# Patient Record
Sex: FEMALE | Race: WHITE | NOT HISPANIC OR LATINO | Employment: FULL TIME | URBAN - METROPOLITAN AREA
[De-identification: names, ages, dates, MRNs, and addresses within clinical notes are randomized per-mention and may not be internally consistent; named-entity substitution may affect disease eponyms.]

---

## 2023-07-28 ENCOUNTER — LAB (OUTPATIENT)
Dept: LAB | Facility: LAB | Age: 58
End: 2023-07-28
Payer: COMMERCIAL

## 2023-07-28 ENCOUNTER — OFFICE VISIT (OUTPATIENT)
Dept: PRIMARY CARE | Facility: CLINIC | Age: 58
End: 2023-07-28
Payer: COMMERCIAL

## 2023-07-28 VITALS
HEART RATE: 88 BPM | BODY MASS INDEX: 20.83 KG/M2 | DIASTOLIC BLOOD PRESSURE: 78 MMHG | SYSTOLIC BLOOD PRESSURE: 118 MMHG | HEIGHT: 64 IN | OXYGEN SATURATION: 98 % | WEIGHT: 122 LBS

## 2023-07-28 DIAGNOSIS — E55.9 VITAMIN D DEFICIENCY: ICD-10-CM

## 2023-07-28 DIAGNOSIS — E55.9 VITAMIN D DEFICIENCY: Primary | ICD-10-CM

## 2023-07-28 DIAGNOSIS — E78.2 MODERATE MIXED HYPERLIPIDEMIA NOT REQUIRING STATIN THERAPY: ICD-10-CM

## 2023-07-28 DIAGNOSIS — R10.13 EPIGASTRIC PAIN: ICD-10-CM

## 2023-07-28 DIAGNOSIS — Z00.00 WELLNESS EXAMINATION: ICD-10-CM

## 2023-07-28 DIAGNOSIS — K21.00 GASTROESOPHAGEAL REFLUX DISEASE WITH ESOPHAGITIS, UNSPECIFIED WHETHER HEMORRHAGE: ICD-10-CM

## 2023-07-28 PROBLEM — L42 PITYRIASIS ROSEA: Status: ACTIVE | Noted: 2023-07-28

## 2023-07-28 PROBLEM — N80.9 ENDOMETRIOSIS: Status: ACTIVE | Noted: 2023-07-28

## 2023-07-28 PROBLEM — M72.2 PLANTAR FASCIITIS OF RIGHT FOOT: Status: ACTIVE | Noted: 2023-07-28

## 2023-07-28 PROBLEM — F41.9 ANXIETY: Status: ACTIVE | Noted: 2023-07-28

## 2023-07-28 PROBLEM — N95.1 HOT FLASHES, MENOPAUSAL: Status: ACTIVE | Noted: 2023-07-28

## 2023-07-28 PROBLEM — K56.609 SBO (SMALL BOWEL OBSTRUCTION) (MULTI): Status: ACTIVE | Noted: 2023-07-28

## 2023-07-28 PROBLEM — R51.9 HEADACHE: Status: ACTIVE | Noted: 2023-07-28

## 2023-07-28 PROBLEM — E78.5 HYPERLIPIDEMIA: Status: ACTIVE | Noted: 2023-07-28

## 2023-07-28 LAB
ALANINE AMINOTRANSFERASE (SGPT) (U/L) IN SER/PLAS: 13 U/L (ref 7–45)
ALBUMIN (G/DL) IN SER/PLAS: 4.6 G/DL (ref 3.4–5)
ALKALINE PHOSPHATASE (U/L) IN SER/PLAS: 61 U/L (ref 33–110)
ANION GAP IN SER/PLAS: 14 MMOL/L (ref 10–20)
ASPARTATE AMINOTRANSFERASE (SGOT) (U/L) IN SER/PLAS: 17 U/L (ref 9–39)
BILIRUBIN TOTAL (MG/DL) IN SER/PLAS: 0.5 MG/DL (ref 0–1.2)
CALCIDIOL (25 OH VITAMIN D3) (NG/ML) IN SER/PLAS: 73 NG/ML
CALCIUM (MG/DL) IN SER/PLAS: 9.2 MG/DL (ref 8.6–10.3)
CARBON DIOXIDE, TOTAL (MMOL/L) IN SER/PLAS: 28 MMOL/L (ref 21–32)
CHLORIDE (MMOL/L) IN SER/PLAS: 102 MMOL/L (ref 98–107)
CHOLESTEROL (MG/DL) IN SER/PLAS: 195 MG/DL (ref 0–199)
CHOLESTEROL IN HDL (MG/DL) IN SER/PLAS: 72.1 MG/DL
CHOLESTEROL/HDL RATIO: 2.7
CREATININE (MG/DL) IN SER/PLAS: 0.62 MG/DL (ref 0.5–1.05)
ERYTHROCYTE DISTRIBUTION WIDTH (RATIO) BY AUTOMATED COUNT: 12.3 % (ref 11.5–14.5)
ERYTHROCYTE MEAN CORPUSCULAR HEMOGLOBIN CONCENTRATION (G/DL) BY AUTOMATED: 32 G/DL (ref 32–36)
ERYTHROCYTE MEAN CORPUSCULAR VOLUME (FL) BY AUTOMATED COUNT: 92 FL (ref 80–100)
ERYTHROCYTES (10*6/UL) IN BLOOD BY AUTOMATED COUNT: 5.01 X10E12/L (ref 4–5.2)
GFR FEMALE: >90 ML/MIN/1.73M2
GLUCOSE (MG/DL) IN SER/PLAS: 80 MG/DL (ref 74–99)
HEMATOCRIT (%) IN BLOOD BY AUTOMATED COUNT: 45.9 % (ref 36–46)
HEMOGLOBIN (G/DL) IN BLOOD: 14.7 G/DL (ref 12–16)
LDL: 96 MG/DL (ref 0–99)
LEUKOCYTES (10*3/UL) IN BLOOD BY AUTOMATED COUNT: 7.8 X10E9/L (ref 4.4–11.3)
MAGNESIUM (MG/DL) IN SER/PLAS: 2.3 MG/DL (ref 1.6–2.4)
PLATELETS (10*3/UL) IN BLOOD AUTOMATED COUNT: 281 X10E9/L (ref 150–450)
POTASSIUM (MMOL/L) IN SER/PLAS: 4.1 MMOL/L (ref 3.5–5.3)
PROTEIN TOTAL: 6.7 G/DL (ref 6.4–8.2)
SODIUM (MMOL/L) IN SER/PLAS: 140 MMOL/L (ref 136–145)
THYROTROPIN (MIU/L) IN SER/PLAS BY DETECTION LIMIT <= 0.05 MIU/L: 0.65 MIU/L (ref 0.44–3.98)
TRIGLYCERIDE (MG/DL) IN SER/PLAS: 133 MG/DL (ref 0–149)
UREA NITROGEN (MG/DL) IN SER/PLAS: 13 MG/DL (ref 6–23)
VLDL: 27 MG/DL (ref 0–40)

## 2023-07-28 PROCEDURE — 82306 VITAMIN D 25 HYDROXY: CPT

## 2023-07-28 PROCEDURE — 99396 PREV VISIT EST AGE 40-64: CPT

## 2023-07-28 PROCEDURE — 80061 LIPID PANEL: CPT

## 2023-07-28 PROCEDURE — 36415 COLL VENOUS BLD VENIPUNCTURE: CPT

## 2023-07-28 PROCEDURE — 83735 ASSAY OF MAGNESIUM: CPT

## 2023-07-28 PROCEDURE — 1036F TOBACCO NON-USER: CPT

## 2023-07-28 PROCEDURE — 85027 COMPLETE CBC AUTOMATED: CPT

## 2023-07-28 PROCEDURE — 80053 COMPREHEN METABOLIC PANEL: CPT

## 2023-07-28 PROCEDURE — 84443 ASSAY THYROID STIM HORMONE: CPT

## 2023-07-28 RX ORDER — ACETAMINOPHEN 500 MG
TABLET ORAL
COMMUNITY
Start: 2021-09-30

## 2023-07-28 RX ORDER — ATORVASTATIN CALCIUM 20 MG/1
20 TABLET, FILM COATED ORAL DAILY
Qty: 30 TABLET | Refills: 2 | Status: SHIPPED | OUTPATIENT
Start: 2023-07-28 | End: 2023-10-25

## 2023-07-28 RX ORDER — ACYCLOVIR 400 MG/1
400 TABLET ORAL 3 TIMES DAILY
Qty: 21 TABLET | Refills: 0 | COMMUNITY
Start: 2022-09-21 | End: 2022-09-28

## 2023-07-28 RX ORDER — DOCUSATE SODIUM 100 MG/1
1 CAPSULE, LIQUID FILLED ORAL 2 TIMES DAILY
COMMUNITY
Start: 2021-09-30

## 2023-07-28 RX ORDER — IBUPROFEN 100 MG/5ML
1 SUSPENSION, ORAL (FINAL DOSE FORM) ORAL DAILY
COMMUNITY
Start: 2021-09-30

## 2023-07-28 RX ORDER — ESOMEPRAZOLE MAGNESIUM 40 MG/1
1 CAPSULE, DELAYED RELEASE ORAL DAILY
COMMUNITY
Start: 2021-03-02

## 2023-07-28 RX ORDER — MULTIVITAMIN
1 TABLET ORAL DAILY
COMMUNITY
Start: 2021-09-30

## 2023-07-28 RX ORDER — ATORVASTATIN CALCIUM 20 MG/1
20 TABLET, FILM COATED ORAL DAILY
Qty: 30 TABLET | Refills: 2 | COMMUNITY
Start: 2023-02-12 | End: 2023-05-13 | Stop reason: WASHOUT

## 2023-07-28 RX ORDER — IBUPROFEN 200 MG
TABLET ORAL
COMMUNITY
Start: 2021-09-30

## 2023-07-28 RX ORDER — MINERAL OIL
1 ENEMA (ML) RECTAL DAILY
COMMUNITY
Start: 2021-09-30

## 2023-07-28 RX ORDER — DICYCLOMINE HYDROCHLORIDE 20 MG/1
TABLET ORAL
COMMUNITY
Start: 2022-08-15

## 2023-07-28 ASSESSMENT — ENCOUNTER SYMPTOMS
COUGH: 0
CONSTIPATION: 0
WHEEZING: 0
ARTHRALGIAS: 0
ABDOMINAL PAIN: 0
BACK PAIN: 0
PALPITATIONS: 0
DYSURIA: 0
LIGHT-HEADEDNESS: 0
WOUND: 0
SINUS PRESSURE: 0
WEAKNESS: 0
PSYCHIATRIC NEGATIVE: 1
SINUS PAIN: 0
NAUSEA: 0
RHINORRHEA: 0
SEIZURES: 0
SHORTNESS OF BREATH: 0
SORE THROAT: 0
UNEXPECTED WEIGHT CHANGE: 0
FEVER: 0
FACIAL ASYMMETRY: 0
TROUBLE SWALLOWING: 0
JOINT SWELLING: 0
HEMATURIA: 0
FATIGUE: 0

## 2023-07-28 ASSESSMENT — PATIENT HEALTH QUESTIONNAIRE - PHQ9
2. FEELING DOWN, DEPRESSED OR HOPELESS: NOT AT ALL
1. LITTLE INTEREST OR PLEASURE IN DOING THINGS: NOT AT ALL
SUM OF ALL RESPONSES TO PHQ9 QUESTIONS 1 AND 2: 0

## 2023-07-28 NOTE — PROGRESS NOTES
Subjective   Patient ID: Marleny Palomino is a 57 y.o. female who presents for New Patient Visit (NPV and needing updated labs done) and Annual Exam.    Pt is here for annual wellness.  Reports overall health is well, has been having some chest pain was seen in ED this past week troponin negative, about 3 months ago started on otc GERD medication, has had hx with gastro due to obstructions in the past, has an appointment coming up that is virtual, has not had an EGD in a number of years.     Do you take any herbs or supplements that were not prescribed by a doctor? no  Are you taking calcium supplements? no  Are you taking aspirin daily? no  Colonoscopy: 2019  Fasting blood work: 7/28  Last eye exam: 2021  Last dental Exam: 2022  Exercise:none  Mood:pleasant  Sleep: very bad, really bad hot flashes  Diet:  well rounded  Occupation:  nurse on [email or web address]  Do you have pain that bothers you in your daily life? no    1. Patient Counseling:  --Nutrition: Stressed importance of moderation in sodium/caffeine intake, saturated fat and cholesterol, caloric balance, sufficient intake of fresh fruits, vegetables, fiber, calcium, iron, and 1 mg of folate supplement per day (for females capable of pregnancy).  --Discussed the issue of estrogen replacement, calcium supplement, and the daily use of baby aspirin.  --Exercise: Stressed the importance of regular exercise.   --Substance Abuse: Discussed cessation/primary prevention of tobacco, alcohol, or other drug use; driving or other dangerous activities under the influence; availability of treatment for abuse.    --Sexuality: Discussed sexually transmitted diseases, partner selection, use of condoms, avoidance of unintended pregnancy  and contraceptive alternatives.   --Injury prevention: Discussed safety belts, safety helmets, smoke detector, smoking near bedding or upholstery.   --Dental health: Discussed importance of regular tooth brushing, flossing, and dental  "visits.  --Immunizations reviewed.  --Discussed benefits of screening colonoscopy.  --After hours service discussed with patient  2 Discussed the patient's BMI with her.  The BMI is in the acceptable range.  3 Follow up as needed for acute illness             Review of Systems   Constitutional:  Negative for fatigue, fever and unexpected weight change.   HENT:  Negative for congestion, ear discharge, ear pain, nosebleeds, postnasal drip, rhinorrhea, sinus pressure, sinus pain, sneezing, sore throat and trouble swallowing.    Respiratory:  Negative for cough, shortness of breath and wheezing.    Cardiovascular:  Positive for chest pain. Negative for palpitations and leg swelling.   Gastrointestinal:  Negative for abdominal pain, constipation and nausea.   Genitourinary:  Negative for dysuria, hematuria, menstrual problem, pelvic pain, vaginal bleeding and vaginal pain.   Musculoskeletal:  Negative for arthralgias, back pain, gait problem and joint swelling.   Skin:  Negative for rash and wound.   Neurological:  Negative for seizures, facial asymmetry, weakness and light-headedness.   Psychiatric/Behavioral: Negative.         Objective   /78   Pulse 88   Ht 1.626 m (5' 4\")   Wt 55.3 kg (122 lb)   SpO2 98%   BMI 20.94 kg/m²     Physical Exam    Assessment/Plan   Problem List Items Addressed This Visit       Hyperlipidemia    Relevant Medications    atorvastatin (Lipitor) 20 mg tablet     Other Visit Diagnoses       Vitamin D deficiency    -  Primary    Relevant Orders    Vitamin D, Total    Wellness examination        Relevant Orders    CBC    Comprehensive Metabolic Panel    Lipid Panel    TSH with reflex to Free T4 if abnormal    Magnesium    Epigastric pain        Relevant Orders    EGD    Gastroesophageal reflux disease with esophagitis, unspecified whether hemorrhage                   "

## 2023-08-22 ENCOUNTER — APPOINTMENT (OUTPATIENT)
Dept: PRIMARY CARE | Facility: CLINIC | Age: 58
End: 2023-08-22
Payer: COMMERCIAL

## 2023-09-28 ENCOUNTER — PATIENT MESSAGE (OUTPATIENT)
Dept: PRIMARY CARE | Facility: CLINIC | Age: 58
End: 2023-09-28

## 2023-09-28 ENCOUNTER — LAB (OUTPATIENT)
Dept: LAB | Facility: LAB | Age: 58
End: 2023-09-28
Payer: COMMERCIAL

## 2023-09-28 DIAGNOSIS — B96.89 URINARY TRACT INFECTION DUE TO ESBL KLEBSIELLA: Primary | ICD-10-CM

## 2023-09-28 DIAGNOSIS — B37.9 CANDIDIASIS: Primary | ICD-10-CM

## 2023-09-28 DIAGNOSIS — N39.0 URINARY TRACT INFECTION DUE TO ESBL KLEBSIELLA: Primary | ICD-10-CM

## 2023-09-28 LAB
APPEARANCE, URINE: CLEAR
BILIRUBIN, URINE: NEGATIVE
BLOOD, URINE: NEGATIVE
COLOR, URINE: YELLOW
GLUCOSE, URINE: NEGATIVE MG/DL
KETONES, URINE: ABNORMAL MG/DL
LEUKOCYTE ESTERASE, URINE: NEGATIVE
NITRITE, URINE: NEGATIVE
PH, URINE: 5 (ref 5–8)
PROTEIN, URINE: NEGATIVE MG/DL
SPECIFIC GRAVITY, URINE: 1.03 (ref 1–1.03)
UROBILINOGEN, URINE: <2 MG/DL (ref 0–1.9)

## 2023-09-29 ENCOUNTER — OFFICE VISIT (OUTPATIENT)
Dept: PRIMARY CARE | Facility: CLINIC | Age: 58
End: 2023-09-29
Payer: COMMERCIAL

## 2023-09-29 ENCOUNTER — LAB (OUTPATIENT)
Dept: LAB | Facility: LAB | Age: 58
End: 2023-09-29
Payer: COMMERCIAL

## 2023-09-29 VITALS
OXYGEN SATURATION: 97 % | SYSTOLIC BLOOD PRESSURE: 115 MMHG | WEIGHT: 123 LBS | DIASTOLIC BLOOD PRESSURE: 71 MMHG | HEART RATE: 74 BPM | BODY MASS INDEX: 21.11 KG/M2

## 2023-09-29 DIAGNOSIS — R68.82 LOW LIBIDO: ICD-10-CM

## 2023-09-29 DIAGNOSIS — M54.50 LUMBAR BACK PAIN: Primary | ICD-10-CM

## 2023-09-29 DIAGNOSIS — M72.2 PLANTAR FASCIITIS OF LEFT FOOT: ICD-10-CM

## 2023-09-29 PROCEDURE — 84402 ASSAY OF FREE TESTOSTERONE: CPT

## 2023-09-29 PROCEDURE — 1036F TOBACCO NON-USER: CPT

## 2023-09-29 PROCEDURE — 84403 ASSAY OF TOTAL TESTOSTERONE: CPT

## 2023-09-29 PROCEDURE — 36415 COLL VENOUS BLD VENIPUNCTURE: CPT

## 2023-09-29 PROCEDURE — 99213 OFFICE O/P EST LOW 20 MIN: CPT

## 2023-09-29 RX ORDER — METHYLPREDNISOLONE 4 MG/1
TABLET ORAL
Qty: 21 TABLET | Refills: 0 | Status: SHIPPED | OUTPATIENT
Start: 2023-09-29 | End: 2023-10-06

## 2023-09-29 ASSESSMENT — ENCOUNTER SYMPTOMS
LEG PAIN: 1
PERIANAL NUMBNESS: 0
ABDOMINAL PAIN: 0
PARESIS: 0
WEIGHT LOSS: 0
NUMBNESS: 0
BACK PAIN: 1
DYSURIA: 0
BOWEL INCONTINENCE: 0
TINGLING: 0
FEVER: 0
WEAKNESS: 0
PARESTHESIAS: 0
HEADACHES: 0

## 2023-09-29 ASSESSMENT — PATIENT HEALTH QUESTIONNAIRE - PHQ9
1. LITTLE INTEREST OR PLEASURE IN DOING THINGS: NOT AT ALL
SUM OF ALL RESPONSES TO PHQ9 QUESTIONS 1 AND 2: 0
2. FEELING DOWN, DEPRESSED OR HOPELESS: NOT AT ALL

## 2023-09-29 NOTE — PROGRESS NOTES
Subjective   Patient ID: Marleny Palomino is a 57 y.o. female who presents for Back Pain.    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   Of note she has had lower back and left foot pain for some time.  Had steroid injection in the left heel for planter faucitis in the past and does endorse a change in gait recently to compensate for a return of this pain.   Had surgically induced menopause 7 years ago and has been struggling recently with fatigue and low libido.     Back Pain  This is a new problem. The pain is present in the lumbar spine and sacro-iliac. The quality of the pain is described as aching. The pain radiates to the left thigh and left foot. The pain is at a severity of 8/10. The pain is severe. The symptoms are aggravated by standing, sitting, stress and twisting. Associated symptoms include leg pain. Pertinent negatives include no abdominal pain, bladder incontinence, bowel incontinence, chest pain, dysuria, fever, headaches, numbness, paresis, paresthesias, pelvic pain, perianal numbness, tingling, weakness or weight loss. Risk factors include menopause. She has tried muscle relaxant and NSAIDs for the symptoms. The treatment provided mild relief.        Review of Systems   Constitutional:  Negative for fever and weight loss.   Cardiovascular:  Negative for chest pain.   Gastrointestinal:  Negative for abdominal pain and bowel incontinence.   Genitourinary:  Negative for bladder incontinence, dysuria and pelvic pain.   Musculoskeletal:  Positive for back pain.   Neurological:  Negative for tingling, weakness, numbness, headaches and paresthesias.       Objective   /71   Pulse 74   Wt 55.8 kg (123 lb)   SpO2 97%   BMI 21.11 kg/m²     Physical Exam  Vitals and nursing note reviewed.   Constitutional:       General:  She is not in acute distress.     Appearance: Normal appearance. She is normal weight. She is not ill-appearing, toxic-appearing or diaphoretic.      Comments: Shifting in chair to maintain some form of comfort.    Musculoskeletal:      Cervical back: Normal.      Thoracic back: Normal.      Lumbar back: Spasms and tenderness present. Positive left straight leg raise test.   Neurological:      Mental Status: She is alert.         Assessment/Plan   Problem List Items Addressed This Visit    None  Visit Diagnoses         Codes    Lumbar back pain    -  Primary M54.50    Relevant Medications    methylPREDNISolone (Medrol Dospak) 4 mg tablets    Other Relevant Orders    XR lumbar spine 2-3 views    Referral to Physical Therapy    Plantar fasciitis of left foot     M72.2    Relevant Orders    Referral to Podiatry

## 2023-09-30 LAB — URINE CULTURE: ABNORMAL

## 2023-09-30 RX ORDER — AMOXICILLIN AND CLAVULANATE POTASSIUM 875; 125 MG/1; MG/1
1 TABLET, FILM COATED ORAL 2 TIMES DAILY
Qty: 20 TABLET | Refills: 0 | Status: SHIPPED | OUTPATIENT
Start: 2023-09-30 | End: 2023-10-10

## 2023-10-05 RX ORDER — FLUCONAZOLE 150 MG/1
150 TABLET ORAL ONCE
Qty: 2 TABLET | Refills: 0 | Status: SHIPPED | OUTPATIENT
Start: 2023-10-05 | End: 2023-10-05

## 2023-10-06 LAB
TESTOSTERONE FREE (CHAN): 1.2 PG/ML (ref 0.1–6.4)
TESTOSTERONE,TOTAL,LC-MS/MS: 11 NG/DL (ref 2–45)

## 2023-10-17 ENCOUNTER — PATIENT MESSAGE (OUTPATIENT)
Dept: PRIMARY CARE | Facility: CLINIC | Age: 58
End: 2023-10-17
Payer: COMMERCIAL

## 2023-10-17 DIAGNOSIS — L57.8 PHOTOAGING OF SKIN: Primary | ICD-10-CM

## 2023-10-17 RX ORDER — TRETINOIN 0.5 MG/G
CREAM TOPICAL NIGHTLY
Qty: 20 G | Refills: 5 | Status: SHIPPED | OUTPATIENT
Start: 2023-10-17 | End: 2024-10-16

## 2023-10-25 DIAGNOSIS — E78.2 MODERATE MIXED HYPERLIPIDEMIA NOT REQUIRING STATIN THERAPY: ICD-10-CM

## 2023-10-25 RX ORDER — ATORVASTATIN CALCIUM 20 MG/1
20 TABLET, FILM COATED ORAL DAILY
Qty: 30 TABLET | Refills: 2 | Status: SHIPPED | OUTPATIENT
Start: 2023-10-25 | End: 2024-01-23

## 2023-10-25 NOTE — TELEPHONE ENCOUNTER
Called and informed pt over voicemail that insurance denied coverage for Ret A cream but pt can chose weather or not to pay out of pocket for it.

## 2023-12-08 ENCOUNTER — CLINICAL SUPPORT (OUTPATIENT)
Dept: PRIMARY CARE | Facility: CLINIC | Age: 58
End: 2023-12-08
Payer: COMMERCIAL

## 2023-12-08 DIAGNOSIS — R50.9 COUGH WITH FEVER: Primary | ICD-10-CM

## 2023-12-08 DIAGNOSIS — R05.9 COUGH WITH FEVER: Primary | ICD-10-CM

## 2023-12-08 PROCEDURE — 87635 SARS-COV-2 COVID-19 AMP PRB: CPT

## 2023-12-09 LAB — SARS-COV-2 RNA RESP QL NAA+PROBE: NOT DETECTED

## 2024-03-22 ENCOUNTER — OFFICE VISIT (OUTPATIENT)
Dept: PRIMARY CARE | Facility: CLINIC | Age: 59
End: 2024-03-22
Payer: COMMERCIAL

## 2024-03-22 VITALS
HEART RATE: 88 BPM | WEIGHT: 124 LBS | HEIGHT: 64 IN | SYSTOLIC BLOOD PRESSURE: 123 MMHG | TEMPERATURE: 98 F | DIASTOLIC BLOOD PRESSURE: 82 MMHG | BODY MASS INDEX: 21.17 KG/M2 | OXYGEN SATURATION: 98 %

## 2024-03-22 DIAGNOSIS — R50.9 COUGH WITH FEVER: ICD-10-CM

## 2024-03-22 DIAGNOSIS — J06.9 UPPER RESPIRATORY TRACT INFECTION, UNSPECIFIED TYPE: Primary | ICD-10-CM

## 2024-03-22 DIAGNOSIS — E78.2 MODERATE MIXED HYPERLIPIDEMIA NOT REQUIRING STATIN THERAPY: ICD-10-CM

## 2024-03-22 DIAGNOSIS — R05.9 COUGH WITH FEVER: ICD-10-CM

## 2024-03-22 DIAGNOSIS — Z12.39 BREAST SCREENING: ICD-10-CM

## 2024-03-22 LAB
POC RAPID INFLUENZA A: NEGATIVE
POC RAPID INFLUENZA B: NEGATIVE

## 2024-03-22 PROCEDURE — 1036F TOBACCO NON-USER: CPT

## 2024-03-22 PROCEDURE — 99213 OFFICE O/P EST LOW 20 MIN: CPT

## 2024-03-22 PROCEDURE — 87804 INFLUENZA ASSAY W/OPTIC: CPT

## 2024-03-22 PROCEDURE — 87635 SARS-COV-2 COVID-19 AMP PRB: CPT

## 2024-03-22 RX ORDER — ATORVASTATIN CALCIUM 20 MG/1
20 TABLET, FILM COATED ORAL DAILY
Qty: 90 TABLET | Refills: 3 | Status: SHIPPED | OUTPATIENT
Start: 2024-03-22

## 2024-03-22 ASSESSMENT — ENCOUNTER SYMPTOMS
VOMITING: 0
MYALGIAS: 1
FATIGUE: 1
FLU SYMPTOMS: 1
HEADACHES: 1
SWOLLEN GLANDS: 0
ABDOMINAL PAIN: 0
NAUSEA: 0
CHANGE IN BOWEL HABIT: 1

## 2024-03-22 ASSESSMENT — PATIENT HEALTH QUESTIONNAIRE - PHQ9
1. LITTLE INTEREST OR PLEASURE IN DOING THINGS: NOT AT ALL
2. FEELING DOWN, DEPRESSED OR HOPELESS: NOT AT ALL
SUM OF ALL RESPONSES TO PHQ9 QUESTIONS 1 AND 2: 0

## 2024-03-22 NOTE — PROGRESS NOTES
"Subjective   Patient ID: Marleny Palomino is a 58 y.o. female who presents for Flu Symptoms (Started Tuesday started with tickle in throat woke up Wednesday with low grade fever, cough, headaches, body aches still feeling that today./Lab to recheck chlosterol/And refill on atorvastatin ).    Flu Symptoms  This is a new problem. Episode onset: 5days ago. The problem occurs constantly. The problem has been unchanged. Associated symptoms include a change in bowel habit, fatigue, headaches and myalgias. Pertinent negatives include no abdominal pain, nausea, swollen glands or vomiting. Associated symptoms comments: Cough productive brown yellow mucus. Nothing aggravates the symptoms. Treatments tried: cough and cold, dayquil. The treatment provided no relief.        Review of Systems   Constitutional:  Positive for fatigue.   Gastrointestinal:  Positive for change in bowel habit. Negative for abdominal pain, nausea and vomiting.   Musculoskeletal:  Positive for myalgias.   Neurological:  Positive for headaches.       Objective   /82   Pulse 88   Temp 36.7 °C (98 °F)   Ht 1.626 m (5' 4\")   Wt 56.2 kg (124 lb)   SpO2 98%   BMI 21.28 kg/m²     Physical Exam  Vitals and nursing note reviewed.   Constitutional:       General: She is not in acute distress.     Appearance: Normal appearance. She is ill-appearing.   HENT:      Nose: Congestion and rhinorrhea present.   Cardiovascular:      Pulses: Normal pulses.      Heart sounds: Normal heart sounds.   Pulmonary:      Breath sounds: Normal breath sounds.   Neurological:      Mental Status: She is alert.         Assessment/Plan   Marleny was seen today for flu symptoms.  Diagnoses and all orders for this visit:  Upper respiratory tract infection, unspecified type  -     POCT Influenza A/B manually resulted  Cough with fever  -     Sars-CoV-2 PCR  Moderate mixed hyperlipidemia not requiring statin therapy  -     atorvastatin (Lipitor) 20 mg tablet; Take 1 tablet (20 mg) " by mouth once daily.  Breast screening  -     BI mammo bilateral screening tomosynthesis; Future      Followup in early aug for annual physical/labs

## 2024-03-23 LAB — SARS-COV-2 RNA RESP QL NAA+PROBE: NOT DETECTED

## 2024-03-25 RX ORDER — AZITHROMYCIN 500 MG/1
500 TABLET, FILM COATED ORAL DAILY
Qty: 5 TABLET | Refills: 0 | Status: SHIPPED | OUTPATIENT
Start: 2024-03-25 | End: 2024-03-30

## 2024-05-06 ENCOUNTER — APPOINTMENT (OUTPATIENT)
Dept: RADIOLOGY | Facility: HOSPITAL | Age: 59
End: 2024-05-06
Payer: COMMERCIAL

## 2024-05-06 ENCOUNTER — HOSPITAL ENCOUNTER (EMERGENCY)
Facility: HOSPITAL | Age: 59
Discharge: HOME | End: 2024-05-06
Payer: COMMERCIAL

## 2024-05-06 ENCOUNTER — CLINICAL SUPPORT (OUTPATIENT)
Dept: EMERGENCY MEDICINE | Facility: HOSPITAL | Age: 59
End: 2024-05-06
Payer: COMMERCIAL

## 2024-05-06 VITALS
SYSTOLIC BLOOD PRESSURE: 154 MMHG | OXYGEN SATURATION: 97 % | HEIGHT: 64 IN | HEART RATE: 75 BPM | RESPIRATION RATE: 16 BRPM | DIASTOLIC BLOOD PRESSURE: 83 MMHG | TEMPERATURE: 97.5 F | WEIGHT: 124 LBS | BODY MASS INDEX: 21.17 KG/M2

## 2024-05-06 DIAGNOSIS — R10.13 ABDOMINAL PAIN, EPIGASTRIC: Primary | ICD-10-CM

## 2024-05-06 LAB
ALBUMIN SERPL BCP-MCNC: 4.5 G/DL (ref 3.4–5)
ALP SERPL-CCNC: 57 U/L (ref 33–110)
ALT SERPL W P-5'-P-CCNC: 16 U/L (ref 7–45)
ANION GAP SERPL CALC-SCNC: 15 MMOL/L (ref 10–20)
APPEARANCE UR: CLEAR
AST SERPL W P-5'-P-CCNC: 21 U/L (ref 9–39)
BASOPHILS # BLD AUTO: 0.03 X10*3/UL (ref 0–0.1)
BASOPHILS NFR BLD AUTO: 0.5 %
BILIRUB SERPL-MCNC: 0.5 MG/DL (ref 0–1.2)
BILIRUB UR STRIP.AUTO-MCNC: NEGATIVE MG/DL
BUN SERPL-MCNC: 20 MG/DL (ref 6–23)
CALCIUM SERPL-MCNC: 9.4 MG/DL (ref 8.6–10.6)
CARDIAC TROPONIN I PNL SERPL HS: <3 NG/L (ref 0–34)
CHLORIDE SERPL-SCNC: 102 MMOL/L (ref 98–107)
CO2 SERPL-SCNC: 30 MMOL/L (ref 21–32)
COLOR UR: NORMAL
CREAT SERPL-MCNC: 0.7 MG/DL (ref 0.5–1.05)
EGFRCR SERPLBLD CKD-EPI 2021: >90 ML/MIN/1.73M*2
EOSINOPHIL # BLD AUTO: 0.06 X10*3/UL (ref 0–0.7)
EOSINOPHIL NFR BLD AUTO: 0.9 %
ERYTHROCYTE [DISTWIDTH] IN BLOOD BY AUTOMATED COUNT: 12.5 % (ref 11.5–14.5)
GLUCOSE SERPL-MCNC: 99 MG/DL (ref 74–99)
GLUCOSE UR STRIP.AUTO-MCNC: NORMAL MG/DL
HCT VFR BLD AUTO: 42.3 % (ref 36–46)
HGB BLD-MCNC: 14.1 G/DL (ref 12–16)
HOLD SPECIMEN: NORMAL
IMM GRANULOCYTES # BLD AUTO: 0.01 X10*3/UL (ref 0–0.7)
IMM GRANULOCYTES NFR BLD AUTO: 0.2 % (ref 0–0.9)
KETONES UR STRIP.AUTO-MCNC: NEGATIVE MG/DL
LACTATE SERPL-SCNC: 0.9 MMOL/L (ref 0.4–2)
LEUKOCYTE ESTERASE UR QL STRIP.AUTO: NEGATIVE
LIPASE SERPL-CCNC: 35 U/L (ref 9–82)
LYMPHOCYTES # BLD AUTO: 2.13 X10*3/UL (ref 1.2–4.8)
LYMPHOCYTES NFR BLD AUTO: 33.3 %
MCH RBC QN AUTO: 29.7 PG (ref 26–34)
MCHC RBC AUTO-ENTMCNC: 33.3 G/DL (ref 32–36)
MCV RBC AUTO: 89 FL (ref 80–100)
MONOCYTES # BLD AUTO: 0.64 X10*3/UL (ref 0.1–1)
MONOCYTES NFR BLD AUTO: 10 %
NEUTROPHILS # BLD AUTO: 3.52 X10*3/UL (ref 1.2–7.7)
NEUTROPHILS NFR BLD AUTO: 55.1 %
NITRITE UR QL STRIP.AUTO: NEGATIVE
NRBC BLD-RTO: 0 /100 WBCS (ref 0–0)
PH UR STRIP.AUTO: 6.5 [PH]
PLATELET # BLD AUTO: 291 X10*3/UL (ref 150–450)
POTASSIUM SERPL-SCNC: 4.6 MMOL/L (ref 3.5–5.3)
PROT SERPL-MCNC: 6.8 G/DL (ref 6.4–8.2)
PROT UR STRIP.AUTO-MCNC: NEGATIVE MG/DL
RBC # BLD AUTO: 4.75 X10*6/UL (ref 4–5.2)
RBC # UR STRIP.AUTO: NEGATIVE /UL
SODIUM SERPL-SCNC: 142 MMOL/L (ref 136–145)
SP GR UR STRIP.AUTO: 1.02
UROBILINOGEN UR STRIP.AUTO-MCNC: NORMAL MG/DL
WBC # BLD AUTO: 6.4 X10*3/UL (ref 4.4–11.3)

## 2024-05-06 PROCEDURE — 83605 ASSAY OF LACTIC ACID: CPT | Performed by: PHYSICIAN ASSISTANT

## 2024-05-06 PROCEDURE — 96374 THER/PROPH/DIAG INJ IV PUSH: CPT | Mod: 59

## 2024-05-06 PROCEDURE — 81003 URINALYSIS AUTO W/O SCOPE: CPT | Performed by: EMERGENCY MEDICINE

## 2024-05-06 PROCEDURE — 36415 COLL VENOUS BLD VENIPUNCTURE: CPT | Performed by: EMERGENCY MEDICINE

## 2024-05-06 PROCEDURE — 99285 EMERGENCY DEPT VISIT HI MDM: CPT | Performed by: PHYSICIAN ASSISTANT

## 2024-05-06 PROCEDURE — 2500000004 HC RX 250 GENERAL PHARMACY W/ HCPCS (ALT 636 FOR OP/ED): Performed by: PHYSICIAN ASSISTANT

## 2024-05-06 PROCEDURE — 2550000001 HC RX 255 CONTRASTS: Performed by: PHYSICIAN ASSISTANT

## 2024-05-06 PROCEDURE — 74177 CT ABD & PELVIS W/CONTRAST: CPT

## 2024-05-06 PROCEDURE — 93005 ELECTROCARDIOGRAM TRACING: CPT

## 2024-05-06 PROCEDURE — 84484 ASSAY OF TROPONIN QUANT: CPT | Performed by: PHYSICIAN ASSISTANT

## 2024-05-06 PROCEDURE — 85025 COMPLETE CBC W/AUTO DIFF WBC: CPT | Performed by: EMERGENCY MEDICINE

## 2024-05-06 PROCEDURE — 99285 EMERGENCY DEPT VISIT HI MDM: CPT | Mod: 25

## 2024-05-06 PROCEDURE — 74177 CT ABD & PELVIS W/CONTRAST: CPT | Performed by: RADIOLOGY

## 2024-05-06 PROCEDURE — 83690 ASSAY OF LIPASE: CPT | Performed by: EMERGENCY MEDICINE

## 2024-05-06 PROCEDURE — 84075 ASSAY ALKALINE PHOSPHATASE: CPT | Performed by: EMERGENCY MEDICINE

## 2024-05-06 PROCEDURE — 36415 COLL VENOUS BLD VENIPUNCTURE: CPT | Performed by: PHYSICIAN ASSISTANT

## 2024-05-06 PROCEDURE — 2500000001 HC RX 250 WO HCPCS SELF ADMINISTERED DRUGS (ALT 637 FOR MEDICARE OP): Performed by: PHYSICIAN ASSISTANT

## 2024-05-06 RX ORDER — ALUMINUM HYDROXIDE, MAGNESIUM HYDROXIDE, AND SIMETHICONE 1200; 120; 1200 MG/30ML; MG/30ML; MG/30ML
20 SUSPENSION ORAL ONCE
Status: COMPLETED | OUTPATIENT
Start: 2024-05-06 | End: 2024-05-06

## 2024-05-06 RX ORDER — FAMOTIDINE 10 MG/ML
40 INJECTION INTRAVENOUS ONCE
Status: COMPLETED | OUTPATIENT
Start: 2024-05-06 | End: 2024-05-06

## 2024-05-06 RX ORDER — LIDOCAINE HYDROCHLORIDE 20 MG/ML
20 SOLUTION OROPHARYNGEAL ONCE
Status: COMPLETED | OUTPATIENT
Start: 2024-05-06 | End: 2024-05-06

## 2024-05-06 RX ORDER — PANTOPRAZOLE SODIUM 40 MG/1
40 TABLET, DELAYED RELEASE ORAL
Qty: 14 TABLET | Refills: 0 | Status: SHIPPED | OUTPATIENT
Start: 2024-05-06 | End: 2024-05-20

## 2024-05-06 RX ORDER — FAMOTIDINE 20 MG/1
20 TABLET, FILM COATED ORAL 2 TIMES DAILY
Qty: 28 TABLET | Refills: 0 | Status: SHIPPED | OUTPATIENT
Start: 2024-05-06 | End: 2024-05-20

## 2024-05-06 RX ADMIN — ALUMINUM HYDROXIDE, MAGNESIUM HYDROXIDE, AND SIMETHICONE 20 ML: 200; 200; 20 SUSPENSION ORAL at 11:35

## 2024-05-06 RX ADMIN — LIDOCAINE HYDROCHLORIDE 20 ML: 20 SOLUTION ORAL at 11:35

## 2024-05-06 RX ADMIN — IOHEXOL 80 ML: 350 INJECTION, SOLUTION INTRAVENOUS at 11:48

## 2024-05-06 RX ADMIN — FAMOTIDINE 20 MG: 10 INJECTION INTRAVENOUS at 11:36

## 2024-05-06 ASSESSMENT — PAIN DESCRIPTION - PROGRESSION: CLINICAL_PROGRESSION: GRADUALLY IMPROVING

## 2024-05-06 ASSESSMENT — PAIN SCALES - GENERAL: PAINLEVEL_OUTOF10: 4

## 2024-05-06 ASSESSMENT — COLUMBIA-SUICIDE SEVERITY RATING SCALE - C-SSRS
1. IN THE PAST MONTH, HAVE YOU WISHED YOU WERE DEAD OR WISHED YOU COULD GO TO SLEEP AND NOT WAKE UP?: NO
2. HAVE YOU ACTUALLY HAD ANY THOUGHTS OF KILLING YOURSELF?: NO
6. HAVE YOU EVER DONE ANYTHING, STARTED TO DO ANYTHING, OR PREPARED TO DO ANYTHING TO END YOUR LIFE?: NO

## 2024-05-06 ASSESSMENT — PAIN - FUNCTIONAL ASSESSMENT: PAIN_FUNCTIONAL_ASSESSMENT: 0-10

## 2024-05-06 NOTE — Clinical Note
Marleny Palomino was seen and treated in our emergency department on 5/6/2024.  She may return to work on 05/07/2024.       If you have any questions or concerns, please don't hesitate to call.      Christel Casanova PA-C

## 2024-05-06 NOTE — ED TRIAGE NOTES
Pt presents to the ED c/o stabbing upper abd pain that started about an hour ago. Pt states that she has a hx of obstructions in the past and she took bentyl about 30min ago but it did not help. Pt denies n/v/d at this time. Pt denies CP or SOB. Pt does state she is having some back pain.

## 2024-05-06 NOTE — ED PROVIDER NOTES
"This is a 58-year-old female with past with history of hyperlipidemia, multiple previous SBO's, previous surgical history of appendectomy, 3 exploratory laparoscopies for lysis of adhesions, partial hysterectomy with bowel injury as well as 2  sections who presents to the ED with epigastric abdominal pain that began suddenly around 930 this morning while she was leaning over changing a dressing on the patient.  She denies any associated nausea, vomiting, diarrhea, constipation.  She states that she had a normal bowel movement yesterday.  She has been able to pass gas.  She is concerned she may have an obstruction, however this does feel different than her SBO's in the past.  She tried taking Bentyl with some relief of her symptoms.  She denies any chest pain or shortness of breath.  She states otherwise she is she denies any urinary symptoms.  She states otherwise she has been in her normal state of health.  She does work as a nurse.      History provided by:  Patient   used: No             Visit Vitals  /83   Pulse 75   Temp 36.4 °C (97.5 °F) (Temporal)   Resp 16   Ht 1.626 m (5' 4\")   Wt 56.2 kg (124 lb)   SpO2 97%   BMI 21.28 kg/m²   Smoking Status Never   BSA 1.59 m²          Physical Exam     Physical exam:   General: Vitals noted, no distress. Afebrile.   EENT:  Hearing grossly intact. Normal phonation. MMM. Airway patient. PERRL. EOMI.   Neck: No midline tenderness or paraspinal tenderness. FROM.   Cardiac: Regular, rate, rhythm. Normal S1 and S2.  No murmurs, gallops, rubs.   Pulmonary: Good air exchange. Lungs clear bilaterally. No wheezes, rhonchi, rales. No accessory muscle use.   Abdomen: Soft, nonsurgical.  Tender to palpation epigastric region.  No peritoneal signs. Normoactive bowel sounds.   Back: No CVA tenderness. No midline tenderness or paraspinal tenderness. No obvious deformity.   Extremities: No peripheral edema.  Full range of motion. Moves all extremities " freely. No tenderness throughout extremities.   Skin: No rash. Warm and Dry.   Neuro: No focal neurologic deficits. CN 2-12 grossly intact. Sensation equal bilaterally. No weakness.         Labs Reviewed   CBC WITH AUTO DIFFERENTIAL       Result Value    WBC 6.4      nRBC 0.0      RBC 4.75      Hemoglobin 14.1      Hematocrit 42.3      MCV 89      MCH 29.7      MCHC 33.3      RDW 12.5      Platelets 291      Neutrophils % 55.1      Immature Granulocytes %, Automated 0.2      Lymphocytes % 33.3      Monocytes % 10.0      Eosinophils % 0.9      Basophils % 0.5      Neutrophils Absolute 3.52      Immature Granulocytes Absolute, Automated 0.01      Lymphocytes Absolute 2.13      Monocytes Absolute 0.64      Eosinophils Absolute 0.06      Basophils Absolute 0.03     COMPREHENSIVE METABOLIC PANEL   LIPASE   URINALYSIS WITH REFLEX CULTURE AND MICROSCOPIC    Narrative:     The following orders were created for panel order Urinalysis with Reflex Culture and Microscopic.  Procedure                               Abnormality         Status                     ---------                               -----------         ------                     Urinalysis with Reflex C...[[id number]]                      In process                 Extra Urine Gray Tube[[id number]]                            In process                   Please view results for these tests on the individual orders.   URINALYSIS WITH REFLEX CULTURE AND MICROSCOPIC   EXTRA URINE GRAY TUBE   LACTATE       CT abdomen pelvis w IV contrast    (Results Pending)         ED Course & MDM     Medical Decision Making  This is a 58-year-old female with a past medical history of multiple previous SBO's with multiple abdominal surgeries who presents to the ED with epigastric abdominal pain without any associated symptoms at this time.  Vital stable upon arrival to the ED.  On examination she was tender to palpation the epigastric region.  No rebound or guarding.  Abdomen soft and  nontender.  No CVA tenderness.  IV established laboratory studies obtained.  Patient had already tried Bentyl prior to coming to the ED so she was ordered Pepcid, Maalox, and Xylocaine for her symptoms.  Laboratory studies ordered including CBC, CMP, lactate, troponin, and urinalysis.  CT abdomen pelvis ordered.  Patient's laboratory studies grossly unremarkable, specifically troponin less than 3, normal lactate.  Urinalysis not concerning for UTI.  Lipase normal at 35.  Normal LFTs.  Normal WBC at 6.4.  On my reevaluation she was feeling improved following the Pepcid, Maalox, and Xylocaine.  CT scan showed no evidence of SBO at this time.  CT also did not show any evidence of acute pathology at this point in time.  I discussed these results with the patient.  Based on her description of her symptoms, improvement following the Pepcid, Xylocaine, Maalox as well as her unremarkable CT scan I discussed with her the possibility this could be related to GERD and recommend she follow-up with her primary care provider if her symptoms persist.  She was given signs and symptoms to return to the ED with.  She was advised to she was written prescriptions for Protonix and Pepcid as needed for her symptoms.  She was given GI follow-up information as needed as well.  She was agreeable to this plan and had no further questions at time of discharge.    Amount and/or Complexity of Data Reviewed  Labs: ordered.  Radiology: ordered and independent interpretation performed.     Details: No visualized SBO.    Risk  Prescription drug management.         Diagnoses as of 05/06/24 1250   Abdominal pain, epigastric       Procedures    MICHELET Perez, ROMI Casanova PA-C  05/06/24 1251

## 2024-05-07 LAB
ATRIAL RATE: 64 BPM
HOLD SPECIMEN: NORMAL
P AXIS: 72 DEGREES
P OFFSET: 200 MS
P ONSET: 143 MS
PR INTERVAL: 164 MS
Q ONSET: 225 MS
QRS COUNT: 11 BEATS
QRS DURATION: 82 MS
QT INTERVAL: 412 MS
QTC CALCULATION(BAZETT): 425 MS
QTC FREDERICIA: 420 MS
R AXIS: 91 DEGREES
T AXIS: 116 DEGREES
T OFFSET: 431 MS
VENTRICULAR RATE: 64 BPM

## 2024-06-26 ENCOUNTER — LAB (OUTPATIENT)
Dept: LAB | Facility: LAB | Age: 59
End: 2024-06-26
Payer: COMMERCIAL

## 2024-06-26 DIAGNOSIS — R39.9 URINARY TRACT INFECTION SYMPTOMS: Primary | ICD-10-CM

## 2024-06-26 DIAGNOSIS — R39.9 URINARY TRACT INFECTION SYMPTOMS: ICD-10-CM

## 2024-06-26 LAB
APPEARANCE UR: CLEAR
BACTERIA #/AREA URNS AUTO: ABNORMAL /HPF
BILIRUB UR STRIP.AUTO-MCNC: NEGATIVE MG/DL
COLOR UR: ABNORMAL
GLUCOSE UR STRIP.AUTO-MCNC: NORMAL MG/DL
KETONES UR STRIP.AUTO-MCNC: NEGATIVE MG/DL
LEUKOCYTE ESTERASE UR QL STRIP.AUTO: ABNORMAL
MUCOUS THREADS #/AREA URNS AUTO: ABNORMAL /LPF
NITRITE UR QL STRIP.AUTO: ABNORMAL
PH UR STRIP.AUTO: 5.5 [PH]
PROT UR STRIP.AUTO-MCNC: NEGATIVE MG/DL
RBC # UR STRIP.AUTO: NEGATIVE /UL
RBC #/AREA URNS AUTO: ABNORMAL /HPF
SP GR UR STRIP.AUTO: 1.01
UROBILINOGEN UR STRIP.AUTO-MCNC: NORMAL MG/DL
WBC #/AREA URNS AUTO: ABNORMAL /HPF

## 2024-06-26 PROCEDURE — 87086 URINE CULTURE/COLONY COUNT: CPT

## 2024-06-26 PROCEDURE — 87186 SC STD MICRODIL/AGAR DIL: CPT

## 2024-06-26 PROCEDURE — 81001 URINALYSIS AUTO W/SCOPE: CPT

## 2024-06-26 NOTE — PROGRESS NOTES
Orders only for urine sample based patient's symptoms of lower back pain/abdominal pain consistent with last UTI for the past 3 days.

## 2024-06-28 ENCOUNTER — PATIENT MESSAGE (OUTPATIENT)
Dept: PRIMARY CARE | Facility: CLINIC | Age: 59
End: 2024-06-28
Payer: COMMERCIAL

## 2024-06-28 DIAGNOSIS — N30.90 CYSTITIS: Primary | ICD-10-CM

## 2024-06-28 RX ORDER — SULFAMETHOXAZOLE AND TRIMETHOPRIM 800; 160 MG/1; MG/1
1 TABLET ORAL 2 TIMES DAILY
Qty: 10 TABLET | Refills: 0 | Status: SHIPPED | OUTPATIENT
Start: 2024-06-28 | End: 2024-07-03

## 2024-06-29 LAB — BACTERIA UR CULT: ABNORMAL

## 2024-08-01 ENCOUNTER — HOSPITAL ENCOUNTER (OUTPATIENT)
Dept: RADIOLOGY | Facility: HOSPITAL | Age: 59
Discharge: HOME | End: 2024-08-01
Payer: COMMERCIAL

## 2024-08-01 VITALS — HEIGHT: 64 IN | BODY MASS INDEX: 21 KG/M2 | WEIGHT: 123 LBS

## 2024-08-01 DIAGNOSIS — Z12.39 BREAST SCREENING: ICD-10-CM

## 2024-08-01 PROCEDURE — 77067 SCR MAMMO BI INCL CAD: CPT

## 2024-08-07 DIAGNOSIS — Z12.39 BREAST SCREENING: Primary | ICD-10-CM

## 2024-08-16 ENCOUNTER — APPOINTMENT (OUTPATIENT)
Dept: PRIMARY CARE | Facility: CLINIC | Age: 59
End: 2024-08-16
Payer: COMMERCIAL

## 2024-08-16 ENCOUNTER — APPOINTMENT (OUTPATIENT)
Dept: RADIOLOGY | Facility: HOSPITAL | Age: 59
End: 2024-08-16
Payer: COMMERCIAL

## 2024-08-16 ENCOUNTER — LAB (OUTPATIENT)
Dept: LAB | Facility: LAB | Age: 59
End: 2024-08-16
Payer: COMMERCIAL

## 2024-08-16 VITALS
HEIGHT: 64 IN | SYSTOLIC BLOOD PRESSURE: 125 MMHG | BODY MASS INDEX: 21 KG/M2 | OXYGEN SATURATION: 98 % | WEIGHT: 123 LBS | DIASTOLIC BLOOD PRESSURE: 80 MMHG | HEART RATE: 60 BPM

## 2024-08-16 DIAGNOSIS — K56.609 SBO (SMALL BOWEL OBSTRUCTION) (MULTI): ICD-10-CM

## 2024-08-16 DIAGNOSIS — Z00.00 WELLNESS EXAMINATION: ICD-10-CM

## 2024-08-16 DIAGNOSIS — N30.90 CYSTITIS: ICD-10-CM

## 2024-08-16 DIAGNOSIS — R68.82 LOW LIBIDO: ICD-10-CM

## 2024-08-16 DIAGNOSIS — E55.9 VITAMIN D DEFICIENCY: ICD-10-CM

## 2024-08-16 DIAGNOSIS — K21.00 GASTROESOPHAGEAL REFLUX DISEASE WITH ESOPHAGITIS, UNSPECIFIED WHETHER HEMORRHAGE: ICD-10-CM

## 2024-08-16 DIAGNOSIS — Z00.00 WELLNESS EXAMINATION: Primary | ICD-10-CM

## 2024-08-16 LAB
25(OH)D3 SERPL-MCNC: 82 NG/ML (ref 30–100)
ALBUMIN SERPL BCP-MCNC: 4.9 G/DL (ref 3.4–5)
ALP SERPL-CCNC: 68 U/L (ref 33–110)
ALT SERPL W P-5'-P-CCNC: 12 U/L (ref 7–45)
ANION GAP SERPL CALC-SCNC: 14 MMOL/L (ref 10–20)
AST SERPL W P-5'-P-CCNC: 19 U/L (ref 9–39)
BILIRUB SERPL-MCNC: 0.7 MG/DL (ref 0–1.2)
BUN SERPL-MCNC: 14 MG/DL (ref 6–23)
CALCIUM SERPL-MCNC: 9.4 MG/DL (ref 8.6–10.3)
CHLORIDE SERPL-SCNC: 102 MMOL/L (ref 98–107)
CHOLEST SERPL-MCNC: 248 MG/DL (ref 0–199)
CHOLESTEROL/HDL RATIO: 3.1
CO2 SERPL-SCNC: 29 MMOL/L (ref 21–32)
CREAT SERPL-MCNC: 0.68 MG/DL (ref 0.5–1.05)
EGFRCR SERPLBLD CKD-EPI 2021: >90 ML/MIN/1.73M*2
ERYTHROCYTE [DISTWIDTH] IN BLOOD BY AUTOMATED COUNT: 12.8 % (ref 11.5–14.5)
GLUCOSE SERPL-MCNC: 79 MG/DL (ref 74–99)
HCT VFR BLD AUTO: 47.1 % (ref 36–46)
HDLC SERPL-MCNC: 81.2 MG/DL
HGB BLD-MCNC: 14.8 G/DL (ref 12–16)
LDLC SERPL CALC-MCNC: 141 MG/DL
MCH RBC QN AUTO: 29.3 PG (ref 26–34)
MCHC RBC AUTO-ENTMCNC: 31.4 G/DL (ref 32–36)
MCV RBC AUTO: 93 FL (ref 80–100)
NON HDL CHOLESTEROL: 167 MG/DL (ref 0–149)
NRBC BLD-RTO: 0 /100 WBCS (ref 0–0)
PLATELET # BLD AUTO: 285 X10*3/UL (ref 150–450)
POC APPEARANCE, URINE: CLEAR
POC BILIRUBIN, URINE: NEGATIVE
POC BLOOD, URINE: NEGATIVE
POC COLOR, URINE: NORMAL
POC GLUCOSE, URINE: NEGATIVE MG/DL
POC KETONES, URINE: NEGATIVE MG/DL
POC LEUKOCYTES, URINE: NEGATIVE
POC NITRITE,URINE: NEGATIVE
POC PH, URINE: 7.5 PH
POC PROTEIN, URINE: NEGATIVE MG/DL
POC SPECIFIC GRAVITY, URINE: 1.01
POC UROBILINOGEN, URINE: 0.2 EU/DL
POTASSIUM SERPL-SCNC: 4.5 MMOL/L (ref 3.5–5.3)
PROT SERPL-MCNC: 6.8 G/DL (ref 6.4–8.2)
RBC # BLD AUTO: 5.05 X10*6/UL (ref 4–5.2)
SODIUM SERPL-SCNC: 140 MMOL/L (ref 136–145)
TRIGL SERPL-MCNC: 129 MG/DL (ref 0–149)
TSH SERPL-ACNC: 0.59 MIU/L (ref 0.44–3.98)
VLDL: 26 MG/DL (ref 0–40)
WBC # BLD AUTO: 6 X10*3/UL (ref 4.4–11.3)

## 2024-08-16 PROCEDURE — 82306 VITAMIN D 25 HYDROXY: CPT

## 2024-08-16 PROCEDURE — 81003 URINALYSIS AUTO W/O SCOPE: CPT

## 2024-08-16 PROCEDURE — 99396 PREV VISIT EST AGE 40-64: CPT

## 2024-08-16 PROCEDURE — 85027 COMPLETE CBC AUTOMATED: CPT

## 2024-08-16 PROCEDURE — 80061 LIPID PANEL: CPT

## 2024-08-16 PROCEDURE — 36415 COLL VENOUS BLD VENIPUNCTURE: CPT

## 2024-08-16 PROCEDURE — 3008F BODY MASS INDEX DOCD: CPT

## 2024-08-16 PROCEDURE — 1036F TOBACCO NON-USER: CPT

## 2024-08-16 PROCEDURE — 80053 COMPREHEN METABOLIC PANEL: CPT

## 2024-08-16 PROCEDURE — 84443 ASSAY THYROID STIM HORMONE: CPT

## 2024-08-16 RX ORDER — DICYCLOMINE HYDROCHLORIDE 20 MG/1
20 TABLET ORAL 4 TIMES DAILY PRN
Qty: 30 TABLET | Refills: 3 | Status: SHIPPED | OUTPATIENT
Start: 2024-08-16

## 2024-08-16 RX ORDER — ESTRADIOL 0.1 MG/G
2 CREAM VAGINAL DAILY
Qty: 42.5 G | Refills: 5 | Status: SHIPPED | OUTPATIENT
Start: 2024-08-16 | End: 2025-08-16

## 2024-08-16 ASSESSMENT — ENCOUNTER SYMPTOMS
FEVER: 0
SORE THROAT: 0
ABDOMINAL PAIN: 0
WHEEZING: 0
CONSTIPATION: 0
UNEXPECTED WEIGHT CHANGE: 0
PSYCHIATRIC NEGATIVE: 1
SINUS PRESSURE: 0
RHINORRHEA: 0
LIGHT-HEADEDNESS: 0
TROUBLE SWALLOWING: 0
SINUS PAIN: 0
WEAKNESS: 0
JOINT SWELLING: 0
NAUSEA: 0
WOUND: 0
COUGH: 0
SHORTNESS OF BREATH: 0
SEIZURES: 0
HEMATURIA: 0
PALPITATIONS: 0
FACIAL ASYMMETRY: 0
ARTHRALGIAS: 0
BACK PAIN: 0
DYSURIA: 0
FATIGUE: 0

## 2024-08-16 NOTE — PROGRESS NOTES
"Subjective   Patient ID: Marleny Palomino is a 58 y.o. female who presents for Annual Exam (Annual CPE and labs/Growth on leg looked at, and recheck urine.).    GERD  She complains of chest pain. She reports no abdominal pain, no coughing, no nausea, no sore throat or no wheezing. This is a recurrent problem. The current episode started more than 1 year ago. The problem occurs occasionally. The problem has been waxing and waning. The symptoms are aggravated by lying down, stress and bending. Pertinent negatives include no fatigue. There are no known risk factors. She has tried an antacid, a diet change, a PPI and a histamine-2 antagonist for the symptoms. The treatment provided mild relief. Past procedures include an EGD. Past invasive treatments do not include gastroplasty, gastroplication or reflux surgery.        Review of Systems   Constitutional:  Negative for fatigue, fever and unexpected weight change.   HENT:  Negative for congestion, ear discharge, ear pain, nosebleeds, postnasal drip, rhinorrhea, sinus pressure, sinus pain, sneezing, sore throat and trouble swallowing.    Respiratory:  Negative for cough, shortness of breath and wheezing.    Cardiovascular:  Positive for chest pain. Negative for palpitations and leg swelling.   Gastrointestinal:  Negative for abdominal pain, constipation and nausea.   Genitourinary:  Negative for dysuria, hematuria, menstrual problem, pelvic pain, vaginal bleeding and vaginal pain.   Musculoskeletal:  Negative for arthralgias, back pain, gait problem and joint swelling.   Skin:  Negative for rash and wound.   Neurological:  Negative for seizures, facial asymmetry, weakness and light-headedness.   Psychiatric/Behavioral: Negative.         Objective   /80   Pulse 60   Ht 1.626 m (5' 4\")   Wt 55.8 kg (123 lb)   SpO2 98%   BMI 21.11 kg/m²     Physical Exam  Constitutional:       Appearance: Normal appearance.   HENT:      Head: Normocephalic and atraumatic.      " Right Ear: Tympanic membrane and external ear normal.      Left Ear: Tympanic membrane and external ear normal.      Nose: Nose normal.      Mouth/Throat:      Mouth: Mucous membranes are moist.      Pharynx: Oropharynx is clear. Uvula midline.   Eyes:      General: Lids are normal.      Extraocular Movements: Extraocular movements intact.      Pupils: Pupils are equal, round, and reactive to light.   Neck:      Thyroid: No thyromegaly or thyroid tenderness.   Cardiovascular:      Rate and Rhythm: Normal rate and regular rhythm.      Heart sounds: Normal heart sounds.   Pulmonary:      Effort: Pulmonary effort is normal.      Breath sounds: Normal breath sounds.   Abdominal:      General: Bowel sounds are normal.      Palpations: Abdomen is soft.      Tenderness: There is no abdominal tenderness. There is no guarding.   Musculoskeletal:         General: No swelling. Normal range of motion.      Cervical back: Normal range of motion.      Right lower leg: No edema.      Left lower leg: No edema.   Lymphadenopathy:      Head:      Right side of head: No submental, submandibular or tonsillar adenopathy.      Left side of head: No submental, submandibular or tonsillar adenopathy.      Cervical: No cervical adenopathy.   Skin:     General: Skin is warm and dry.      Capillary Refill: Capillary refill takes less than 2 seconds.      Coloration: Skin is not jaundiced.      Findings: No lesion or rash.   Neurological:      General: No focal deficit present.      Mental Status: She is alert and oriented to person, place, and time.   Psychiatric:         Mood and Affect: Mood normal.         Behavior: Behavior normal.         Thought Content: Thought content normal.         Judgment: Judgment normal.         Assessment/Plan   Marleny was seen today for annual exam.  Diagnoses and all orders for this visit:  Cystitis  -     POCT UA Automated manually resulted  Gastroesophageal reflux disease with esophagitis, unspecified  whether hemorrhage  SBO (small bowel obstruction) (Multi)  -     dicyclomine (Bentyl) 20 mg tablet; Take 1 tablet (20 mg) by mouth 4 times a day as needed (abdominal pain).  Wellness examination  -     TSH with reflex to Free T4 if abnormal; Future  -     Lipid Panel; Future  -     CBC; Future  -     Comprehensive Metabolic Panel; Future  Vitamin D deficiency  -     Vitamin D 25-Hydroxy,Total (for eval of Vitamin D levels); Future  Low libido  -     estradiol (Estrace) 0.01 % (0.1 mg/gram) vaginal cream; Insert 0.5 Applicatorfuls (2 g) into the vagina once daily. Apply to vagina nightly for 1 week then every Monday/Wednesday/Friday.

## 2024-08-30 ENCOUNTER — APPOINTMENT (OUTPATIENT)
Dept: RADIOLOGY | Facility: HOSPITAL | Age: 59
End: 2024-08-30
Payer: COMMERCIAL

## 2024-08-30 ENCOUNTER — HOSPITAL ENCOUNTER (OUTPATIENT)
Dept: RADIOLOGY | Facility: HOSPITAL | Age: 59
Discharge: HOME | End: 2024-08-30
Payer: COMMERCIAL

## 2024-08-30 DIAGNOSIS — Z12.39 BREAST SCREENING: ICD-10-CM

## 2024-08-30 PROCEDURE — 77061 BREAST TOMOSYNTHESIS UNI: CPT | Mod: LEFT SIDE | Performed by: STUDENT IN AN ORGANIZED HEALTH CARE EDUCATION/TRAINING PROGRAM

## 2024-08-30 PROCEDURE — 77065 DX MAMMO INCL CAD UNI: CPT | Mod: LEFT SIDE | Performed by: STUDENT IN AN ORGANIZED HEALTH CARE EDUCATION/TRAINING PROGRAM

## 2024-08-30 PROCEDURE — 77061 BREAST TOMOSYNTHESIS UNI: CPT | Mod: LT

## 2024-12-16 ENCOUNTER — PATIENT MESSAGE (OUTPATIENT)
Dept: PRIMARY CARE | Facility: CLINIC | Age: 59
End: 2024-12-16
Payer: COMMERCIAL

## 2024-12-16 DIAGNOSIS — K56.609 SBO (SMALL BOWEL OBSTRUCTION) (MULTI): ICD-10-CM

## 2024-12-17 RX ORDER — DICYCLOMINE HYDROCHLORIDE 20 MG/1
20 TABLET ORAL 4 TIMES DAILY PRN
Qty: 30 TABLET | Refills: 3 | Status: SHIPPED | OUTPATIENT
Start: 2024-12-17

## 2024-12-26 DIAGNOSIS — R10.13 EPIGASTRIC PAIN: ICD-10-CM

## 2024-12-26 DIAGNOSIS — K21.00 GASTROESOPHAGEAL REFLUX DISEASE WITH ESOPHAGITIS, UNSPECIFIED WHETHER HEMORRHAGE: Primary | ICD-10-CM

## 2025-04-11 DIAGNOSIS — E78.2 MODERATE MIXED HYPERLIPIDEMIA NOT REQUIRING STATIN THERAPY: ICD-10-CM

## 2025-04-11 RX ORDER — ATORVASTATIN CALCIUM 20 MG/1
20 TABLET, FILM COATED ORAL DAILY
Qty: 90 TABLET | Refills: 2 | Status: SHIPPED | OUTPATIENT
Start: 2025-04-11

## 2025-05-14 PROCEDURE — 88341 IMHCHEM/IMCYTCHM EA ADD ANTB: CPT | Performed by: DERMATOLOGY

## 2025-05-14 PROCEDURE — 88342 IMHCHEM/IMCYTCHM 1ST ANTB: CPT | Performed by: DERMATOLOGY

## 2025-05-14 PROCEDURE — 88305 TISSUE EXAM BY PATHOLOGIST: CPT | Performed by: DERMATOLOGY

## 2025-05-16 ENCOUNTER — LAB REQUISITION (OUTPATIENT)
Dept: DERMATOPATHOLOGY | Facility: CLINIC | Age: 60
End: 2025-05-16
Payer: COMMERCIAL

## 2025-05-16 DIAGNOSIS — L53.8 OTHER SPECIFIED ERYTHEMATOUS CONDITIONS: ICD-10-CM

## 2025-05-16 DIAGNOSIS — D48.5 NEOPLASM OF UNCERTAIN BEHAVIOR OF SKIN: ICD-10-CM

## 2025-05-20 LAB
LAB AP ASR DISCLAIMER: NORMAL
LABORATORY COMMENT REPORT: NORMAL
PATH REPORT.FINAL DX SPEC: NORMAL
PATH REPORT.GROSS SPEC: NORMAL
PATH REPORT.RELEVANT HX SPEC: NORMAL
PATH REPORT.TOTAL CANCER: NORMAL

## 2025-06-24 ENCOUNTER — TELEPHONE (OUTPATIENT)
Dept: PRIMARY CARE | Facility: CLINIC | Age: 60
End: 2025-06-24
Payer: COMMERCIAL

## 2025-06-24 NOTE — TELEPHONE ENCOUNTER
Patient calling to see if you would be willing to complete [facility] paperwork for her again, she has another bowel obstruction.

## 2025-06-25 ENCOUNTER — TELEMEDICINE (OUTPATIENT)
Dept: PRIMARY CARE | Facility: CLINIC | Age: 60
End: 2025-06-25
Payer: COMMERCIAL

## 2025-06-25 DIAGNOSIS — K56.609 SBO (SMALL BOWEL OBSTRUCTION) (MULTI): ICD-10-CM

## 2025-06-25 DIAGNOSIS — K59.04 CHRONIC IDIOPATHIC CONSTIPATION: Primary | ICD-10-CM

## 2025-06-25 PROCEDURE — 99213 OFFICE O/P EST LOW 20 MIN: CPT

## 2025-06-25 PROCEDURE — 1036F TOBACCO NON-USER: CPT

## 2025-06-25 RX ORDER — METOCLOPRAMIDE 10 MG/1
10 TABLET ORAL 4 TIMES DAILY PRN
Qty: 30 TABLET | Refills: 0 | Status: SHIPPED | OUTPATIENT
Start: 2025-06-25 | End: 2025-08-24

## 2025-06-25 RX ORDER — DICYCLOMINE HYDROCHLORIDE 20 MG/1
20 TABLET ORAL 4 TIMES DAILY PRN
Qty: 30 TABLET | Refills: 3 | Status: SHIPPED | OUTPATIENT
Start: 2025-06-25

## 2025-06-25 ASSESSMENT — ENCOUNTER SYMPTOMS
BELCHING: 0
MYALGIAS: 0
ANOREXIA: 1
HEMATURIA: 0
CONSTIPATION: 1
HEMATOCHEZIA: 0
WEIGHT LOSS: 1
DIARRHEA: 0
VOMITING: 0
FLATUS: 0
FREQUENCY: 0
DYSURIA: 0
FEVER: 0
ARTHRALGIAS: 0
NAUSEA: 1
HEADACHES: 1
ABDOMINAL PAIN: 1

## 2025-06-25 NOTE — PROGRESS NOTES
On Demand Virtual Visit Patient Consent     This visit was completed via video conference. All issues as below were discussed and addressed but no physical exam was performed. If it was felt that the patient should be evaluated in clinic than they were directed there. The patient verbally consented to the visit.    An interactive audio and video telecommunication system which permits real time communications between the patient (at the originating site) and provider (at the distant site) was utilized to provide this telehealth service.   Verbal consent was requested and obtained from Marleny Palomino (or parent if under 18) 06/25/25 for a telehealth visit.   I have verbally confirmed with Marleny Palomino (or parent if under 18) that they are physically located in the [facility] during this virtual visit.    Subjective   Patient ID: Marleny Palomino is a 59 y.o. female who presents for No chief complaint on file..  Abdominal Pain  This is a recurrent problem. The current episode started in the past 7 days. The onset quality is sudden. The problem occurs 2 to 4 times per day. The most recent episode lasted 5 days. The problem has been waxing and waning. The pain is located in the generalized abdominal region, periumbilical region and suprapubic region. The pain is at a severity of 9/10. The quality of the pain is cramping and sharp. The abdominal pain radiates to the periumbilical region and suprapubic region. Associated symptoms include anorexia, constipation, headaches, nausea and weight loss. Pertinent negatives include no arthralgias, belching, diarrhea, dysuria, fever, flatus, frequency, hematochezia, hematuria, melena, myalgias or vomiting. The pain is aggravated by certain positions, eating and movement. The pain is relieved by Movement and recumbency. Prior diagnostic workup includes lower endoscopy, ultrasound, GI consult and CT scan. chronic idopathic bowel obstructions.       Review of Systems    Constitutional:  Positive for weight loss. Negative for fever.   Gastrointestinal:  Positive for abdominal pain, anorexia, constipation and nausea. Negative for diarrhea, flatus, hematochezia, melena and vomiting.   Genitourinary:  Negative for dysuria, frequency and hematuria.   Musculoskeletal:  Negative for arthralgias and myalgias.   Neurological:  Positive for headaches.       Objective     There were no vitals taken for this visit.       Physical Exam  Constitutional:       General: She is not in acute distress.     Appearance: Normal appearance. She is ill-appearing. She is not diaphoretic.   Pulmonary:      Effort: Pulmonary effort is normal.   Neurological:      Mental Status: She is alert and oriented to person, place, and time.         Assessment/Plan   Diagnoses and all orders for this visit:  Chronic idiopathic constipation  -     metoclopramide (Reglan) 10 mg tablet; Take 1 tablet (10 mg) by mouth 4 times a day as needed (nausea).  SBO (small bowel obstruction) (Multi)  -     dicyclomine (Bentyl) 20 mg tablet; Take 1 tablet (20 mg) by mouth 4 times a day as needed (abdominal pain).    Will file for [facility] due to missing work

## 2025-08-19 ENCOUNTER — APPOINTMENT (OUTPATIENT)
Dept: PRIMARY CARE | Facility: CLINIC | Age: 60
End: 2025-08-19
Payer: COMMERCIAL

## 2025-09-23 ENCOUNTER — APPOINTMENT (OUTPATIENT)
Dept: PRIMARY CARE | Facility: CLINIC | Age: 60
End: 2025-09-23
Payer: COMMERCIAL

## 2025-12-08 ENCOUNTER — APPOINTMENT (OUTPATIENT)
Dept: DERMATOLOGY | Facility: HOSPITAL | Age: 60
End: 2025-12-08
Payer: COMMERCIAL